# Patient Record
(demographics unavailable — no encounter records)

---

## 2024-10-25 NOTE — PHYSICAL EXAM
[de-identified] : Constitutional: Well-nourished, well-developed, No acute distress Respiratory:  Good respiratory effort, no SOB Psychiatric: Pleasant and normal affect, alert and oriented x3 Skin: Clean dry and intact B/L UE Musculoskeletal: normal except where as noted in regional exam   Right Shoulder: APPEARANCE: no marked deformities, no swelling or malalignment POSITIVE TENDERNESS: supraspinatus NONTENDER:  long head biceps tendon, infraspinatus, teres minor. biceps. anterior and posterior capsule. AC joint. ROM: full with mild painful arc past 60 degrees, no scapular winging or dyskinesia present RESISTIVE TESTING: MMT 4+/5 ER, Flexion and Empty can, 5/5 IR. painless 5/5 resisted ext, horizontal abd/add SPECIAL TESTS: + Oliver and Neers, mildly + cross arm adduction, + Speeds, neg Pearce's, neg Drop Arm, neg Apprehension. neg apley's scratch test

## 2024-10-25 NOTE — DISCUSSION/SUMMARY
[de-identified] : Discussed findings of today's exam and possible causes of patient's pain.  Educated patient on their most probable diagnosis of chronic intermittent right shoulder pain with recent atraumatic exacerbation due to subacromial calcific tendinopathy.  Reviewed possible courses of treatment, and we collaboratively decided best course of treatment at this time will include conservative management.  Patient was specifically looking for barbotage procedure to be done today, she is advised that I do not perform that procedure as part of my practice, but she could be referred to interventional radiology to undergo that procedure.  Patient was advised we can also consider ultrasound-guided subacromial cortisone injection for short-term pain relief to be followed by a course of physical therapy.  Patient is interested in a cortisone injection today as she was told by her prior orthopedic that the calcific deposit in her shoulder was fairly small.  She has her office visit report from last orthopedic, but no CD of images, there is no qualification of the size of the calcium deposit on their x-ray report.  After discussing possible side effects of the injection, the patient stated that her son's wedding is tomorrow, advised the patient that today's cortisone injection today is unlikely to provide immediate pain relief, and there is possibly a postinjection flareup, would recommend deferral of cortisone injection before this milestone event.  We discussed that she could be given a referral to interventional radiology to undergo barbotage and subsequent cortisone injection to be scheduled at a later date, she would like to defer, she would like to come back for cortisone injection at her next convenience.  Patient appreciates and agrees with current plan.  This note was generated using dragon medical dictation software.  A reasonable effort has been made for proofreading its contents, but typos may still remain.  If there are any questions or points of clarification needed please notify my office.

## 2024-10-25 NOTE — HISTORY OF PRESENT ILLNESS
[de-identified] : 62 F, denies PMH, presents for right shoulder pain and stiffness since august She does not recall any specific FLACO or inciting event She was evaluated at [facility] [place] [facility]/24. She presents with printout of xray which shows calcific tendonitis She endorses pain with pulling, reaching behind and with overhead activites. She took meloxicam for pain, with minimal relief

## 2024-11-15 NOTE — HISTORY OF PRESENT ILLNESS
[de-identified] : 62 F, denies PMH, presents for right shoulder pain and stiffness since august She does not recall any specific FLACO or inciting event She was evaluated at [facility] [place] 8/19/24. At last office visit provided a printout of xray which showed calcific tendonitis She endorses continued pain with pulling, reaching behind and with overhead activities. At last visit she deferred cortisone injection due to her son's upcoming wedding and has returned requesting cortisone injection.

## 2024-11-15 NOTE — DISCUSSION/SUMMARY
[de-identified] : Patient was seen today for reevaluation of chronic intermittent right shoulder pain with recent atraumatic exacerbation due to subacromial calcific tendinitis.  We discussed various treatment options as well as associated risk/benefits/alternatives and patient elected to proceed with right subacromial ultrasound-guided cortisone injection today (see procedure note).  Informed the patient that the numbing medicine in today's injection will last for about 4-6 hours. The steroid that was injected will start to work in 1 to 2 days, peak at 1-2 weeks, and may last up to 1-2 months.  Patient will be started on a course of physical therapy to restore normal range of motion and strength as tolerated.  Follow up as needed.  Patient appreciates and agrees with current plan.  This note was generated using dragon medical dictation software.  A reasonable effort has been made for proofreading its contents, but typos may still remain.  If there are any questions or points of clarification needed please notify my office.

## 2024-11-15 NOTE — PHYSICAL EXAM
[de-identified] : Constitutional: Well-nourished, well-developed, No acute distress Respiratory:  Good respiratory effort, no SOB Psychiatric: Pleasant and normal affect, alert and oriented x3 Skin: Clean dry and intact B/L UE Musculoskeletal: normal except where as noted in regional exam   Right Shoulder: APPEARANCE: no marked deformities, no swelling or malalignment POSITIVE TENDERNESS: supraspinatus NONTENDER:  long head biceps tendon, infraspinatus, teres minor. biceps. anterior and posterior capsule. AC joint. ROM: full with mild painful arc past 60 degrees, no scapular winging or dyskinesia present RESISTIVE TESTING: MMT 4+/5 ER, Flexion and Empty can, 5/5 IR. painless 5/5 resisted ext, horizontal abd/add SPECIAL TESTS: + Oliver and Neers, mildly + cross arm adduction, + Speeds, neg Pearce's, neg Drop Arm, neg Apprehension. neg apley's scratch test    [de-identified] : The following radiographs were ordered and read by me during this patient's visit. I reviewed each radiograph in detail with the patient and discussed the findings as highlighted below.   3 views of the right shoulder were obtained today that show degenerative changes and evidence of mild glenohumeral and AC joint osteoarthritis.  There is a very small calcific deposit seen off the lateral portion of the humeral head.  No fracture, or dislocation seen at this time. There is no malalignment. No other obvious osseous abnormality. Otherwise unremarkable.

## 2024-11-15 NOTE — PHYSICAL EXAM
[de-identified] : Constitutional: Well-nourished, well-developed, No acute distress Respiratory:  Good respiratory effort, no SOB Psychiatric: Pleasant and normal affect, alert and oriented x3 Skin: Clean dry and intact B/L UE Musculoskeletal: normal except where as noted in regional exam   Right Shoulder: APPEARANCE: no marked deformities, no swelling or malalignment POSITIVE TENDERNESS: supraspinatus NONTENDER:  long head biceps tendon, infraspinatus, teres minor. biceps. anterior and posterior capsule. AC joint. ROM: full with mild painful arc past 60 degrees, no scapular winging or dyskinesia present RESISTIVE TESTING: MMT 4+/5 ER, Flexion and Empty can, 5/5 IR. painless 5/5 resisted ext, horizontal abd/add SPECIAL TESTS: + Oliver and Neers, mildly + cross arm adduction, + Speeds, neg Pearce's, neg Drop Arm, neg Apprehension. neg apley's scratch test    37 [de-identified] : The following radiographs were ordered and read by me during this patient's visit. I reviewed each radiograph in detail with the patient and discussed the findings as highlighted below.   3 views of the right shoulder were obtained today that show degenerative changes and evidence of mild glenohumeral and AC joint osteoarthritis.  There is a very small calcific deposit seen off the lateral portion of the humeral head.  No fracture, or dislocation seen at this time. There is no malalignment. No other obvious osseous abnormality. Otherwise unremarkable. 34

## 2024-11-15 NOTE — PROCEDURE
[de-identified] : Ultrasound Guided Injection  Indication: Ensure placement within the involved supraspinatus tendon and subacromial bursa  Utlizing the [employer] portable ultrasound machine, the Linear 6cm 13-6 MHz transducer, sterile probe cover and sterile ultrasound gel, ultrasound guidance with the probe in the transverse axis, utilizing an in-plane approach, was used for the following injection:   Injection: Right shoulder Subacromial Space. Indication: Calcific tendinitis.  A discussion was had with the patient regarding this procedure and all questions were answered. All risks, benefits and alternatives were discussed. These included but were not limited to bleeding, infection, and allergic reaction.  A timeout was done to ensure correct side and pt agreed to the procedure.   Alcohol was used to clean the skin, and betadine was used to sterilize and prep the area in the posterolateral aspect of the shoulder. Ethyl chloride spray was then used as a topical anesthetic. A 22-gauge 1.5" needle was used to inject 2cc of 0.25% bupivacaine and 1cc of 40mg/ml methylprednisolone into the subacromial space. A sterile bandage was then applied. The patient tolerated the procedure well and there were no complications.

## 2024-11-15 NOTE — HISTORY OF PRESENT ILLNESS
[de-identified] : 62 F, denies PMH, presents for right shoulder pain and stiffness since august She does not recall any specific FLACO or inciting event She was evaluated at [facility] [place] 8/19/24. At last office visit provided a printout of xray which showed calcific tendonitis She endorses continued pain with pulling, reaching behind and with overhead activities. At last visit she deferred cortisone injection due to her son's upcoming wedding and has returned requesting cortisone injection.

## 2024-11-15 NOTE — PROCEDURE
[de-identified] : Ultrasound Guided Injection  Indication: Ensure placement within the involved supraspinatus tendon and subacromial bursa  Utlizing the [employer] portable ultrasound machine, the Linear 6cm 13-6 MHz transducer, sterile probe cover and sterile ultrasound gel, ultrasound guidance with the probe in the transverse axis, utilizing an in-plane approach, was used for the following injection:   Injection: Right shoulder Subacromial Space. Indication: Calcific tendinitis.  A discussion was had with the patient regarding this procedure and all questions were answered. All risks, benefits and alternatives were discussed. These included but were not limited to bleeding, infection, and allergic reaction.  A timeout was done to ensure correct side and pt agreed to the procedure.   Alcohol was used to clean the skin, and betadine was used to sterilize and prep the area in the posterolateral aspect of the shoulder. Ethyl chloride spray was then used as a topical anesthetic. A 22-gauge 1.5" needle was used to inject 2cc of 0.25% bupivacaine and 1cc of 40mg/ml methylprednisolone into the subacromial space. A sterile bandage was then applied. The patient tolerated the procedure well and there were no complications.

## 2025-01-23 NOTE — ASSESSMENT
[FreeTextEntry1] : Impression: Hallux valgus. Hammertoes. Pain in right foot.  Treatment: I enucleated the keratotic lesion after prepping with alcohol. I put aperture pad on. I want her to use Aquaphor. I want her to use toe separators and wear wider shoes. I discussed surgery, which would be an Lucho-Akin bunionectomy with hammertoe surgery. I discussed procedure and postoperative course over the conservative route. Continue the silicone toe separator. Follow-up in the office with continued pain that persists. I did debride the hallux mycotic nails, utilizing a small straight nail splitter and rotary all, without incident today.

## 2025-01-23 NOTE — ASSESSMENT
[FreeTextEntry1] : Impression: Hallux valgus. Hammertoes. Pain in right foot.  Treatment: I enucleated the keratotic lesion after prepping with alcohol. I put aperture pad on. I want her to use Aquaphor. I want her to use toe separators and wear wider shoes. I discussed surgery, which would be an Ulcho-Akin bunionectomy with hammertoe surgery. I discussed procedure and postoperative course over the conservative route. Continue the silicone toe separator. Follow-up in the office with continued pain that persists. I did debride the hallux mycotic nails, utilizing a small straight nail splitter and rotary all, without incident today.

## 2025-01-23 NOTE — PHYSICAL EXAM
[2+] : left foot dorsalis pedis 2+ [Sensation] : the sensory exam was normal to light touch and pinprick [No Focal Deficits] : no focal deficits [Deep Tendon Reflexes (DTR)] : deep tendon reflexes were 2+ and symmetric [Motor Exam] : the motor exam was normal [Ankle Swelling (On Exam)] : not present [Varicose Veins Of Lower Extremities] : not present [Delayed in the Right Toes] : capillary refills normal in right toes [Delayed in the Left Toes] : capillary refills normal in the left toes [FreeTextEntry3] : Hair growth noted on digits. Proximal to distal cooling is within normal limits.  [de-identified] : HAV with bunion with hallux interphalangeus. Arthritic 2nd hammertoe with flexion contracture deformity, moderate deformity and pain at the 1st interspace. [FreeTextEntry1] : She has preulcerative keratotic lesion at the right 1st interspace, 2nd toe and the hallux.

## 2025-01-23 NOTE — HISTORY OF PRESENT ILLNESS
There are no Wet Read(s) to document.
[FreeTextEntry1] : Patient presents today because of painful right bunion and right 2nd hammertoe. She has painful lesion and progressing deformity that interferes with her lifestyle. It is painful in shoe gear.  She also has painful hallux mycotic nails.

## 2025-01-23 NOTE — PHYSICAL EXAM
[2+] : left foot dorsalis pedis 2+ [Sensation] : the sensory exam was normal to light touch and pinprick [No Focal Deficits] : no focal deficits [Deep Tendon Reflexes (DTR)] : deep tendon reflexes were 2+ and symmetric [Motor Exam] : the motor exam was normal [Ankle Swelling (On Exam)] : not present [Varicose Veins Of Lower Extremities] : not present [Delayed in the Right Toes] : capillary refills normal in right toes [Delayed in the Left Toes] : capillary refills normal in the left toes [FreeTextEntry3] : Hair growth noted on digits. Proximal to distal cooling is within normal limits.  [de-identified] : HAV with bunion with hallux interphalangeus. Arthritic 2nd hammertoe with flexion contracture deformity, moderate deformity and pain at the 1st interspace. [FreeTextEntry1] : She has preulcerative keratotic lesion at the right 1st interspace, 2nd toe and the hallux.

## 2025-01-23 NOTE — PHYSICAL EXAM
lov annual wellness 8/26/20    Scheduled patient 3/11/21    Also scheduled annual wellness 8/30/2021 [2+] : left foot dorsalis pedis 2+ [Sensation] : the sensory exam was normal to light touch and pinprick [No Focal Deficits] : no focal deficits [Deep Tendon Reflexes (DTR)] : deep tendon reflexes were 2+ and symmetric [Motor Exam] : the motor exam was normal [Ankle Swelling (On Exam)] : not present [Varicose Veins Of Lower Extremities] : not present [Delayed in the Right Toes] : capillary refills normal in right toes [Delayed in the Left Toes] : capillary refills normal in the left toes [FreeTextEntry3] : Hair growth noted on digits. Proximal to distal cooling is within normal limits.  [de-identified] : HAV with bunion with hallux interphalangeus. Arthritic 2nd hammertoe with flexion contracture deformity, moderate deformity and pain at the 1st interspace. [FreeTextEntry1] : She has preulcerative keratotic lesion at the right 1st interspace, 2nd toe and the hallux.